# Patient Record
Sex: FEMALE | Race: WHITE
[De-identification: names, ages, dates, MRNs, and addresses within clinical notes are randomized per-mention and may not be internally consistent; named-entity substitution may affect disease eponyms.]

---

## 2022-06-10 ENCOUNTER — HOSPITAL ENCOUNTER (OUTPATIENT)
Dept: HOSPITAL 75 - RAD | Age: 37
End: 2022-06-10
Attending: NURSE PRACTITIONER
Payer: COMMERCIAL

## 2022-06-10 DIAGNOSIS — N63.20: Primary | ICD-10-CM

## 2022-06-10 PROCEDURE — 77062 BREAST TOMOSYNTHESIS BI: CPT

## 2022-06-10 PROCEDURE — 76642 ULTRASOUND BREAST LIMITED: CPT

## 2022-06-10 PROCEDURE — 77066 DX MAMMO INCL CAD BI: CPT

## 2022-06-10 NOTE — DIAGNOSTIC IMAGING REPORT
INDICATION: Palpable lump right axilla.



No prior studies are available for comparison.



2-D and 3-D bilateral diagnostic mammography was performed with

CAD. BB marker was placed at the area of palpable abnormality in

the right axilla.



Both breasts are heterogeneously dense, limiting the sensitivity

of mammography. No dominant mass or malignant-appearing

microcalcifications are seen. Multiple normal-sized lymph nodes

in the axillae are noted bilaterally.



IMPRESSION: No mammographic features suspicious for malignancy

are identified. Even so, sonographic interrogation of the area

palpable abnormality in the right axilla is recommended and will

be performed today.



ACR BI-RADS Category 0: Incomplete. (Needs additional imaging

evaluation).

Result letter will be mailed to the patient.

Note: At least 10% of breast cancer is not imaged by mammography.



BI-RADS 0



Dictated by: 



  Dictated on workstation # [id number]

## 2022-06-10 NOTE — DIAGNOSTIC IMAGING REPORT
INDICATION: Right breast lump.



Correlation is made with diagnostic mammogram earlier same day.



Sonographic interrogation of the area of palpable abnormality in

the right axilla was performed. There is a normal-sized lymph

node measuring 1.3 x 0.8 x 1.1 cm at the area of palpable

abnormality. No suspicious mass is identified.



IMPRESSION: BI-RADS Category 2



Normal-appearing right axillary lymph node appears to account for

the palpable abnormality. No other abnormalities are seen.



ACR BI-RADS Category 2: Benign findings.



Dictated by: 



  Dictated on workstation # [id number]